# Patient Record
Sex: MALE
[De-identification: names, ages, dates, MRNs, and addresses within clinical notes are randomized per-mention and may not be internally consistent; named-entity substitution may affect disease eponyms.]

---
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
## 2021-08-02 ENCOUNTER — TRANSCRIPTION ENCOUNTER (OUTPATIENT)
Age: 21
End: 2021-08-02

## 2022-05-02 ENCOUNTER — NON-APPOINTMENT (OUTPATIENT)
Age: 22
End: 2022-05-02

## 2022-05-07 PROBLEM — M25.512 LEFT SHOULDER PAIN: Status: ACTIVE | Noted: 2022-05-07

## 2022-05-10 ENCOUNTER — APPOINTMENT (OUTPATIENT)
Dept: ORTHOPEDIC SURGERY | Facility: CLINIC | Age: 22
End: 2022-05-10
Payer: COMMERCIAL

## 2022-05-10 DIAGNOSIS — M25.512 PAIN IN LEFT SHOULDER: ICD-10-CM

## 2022-05-10 DIAGNOSIS — S46.012A STRAIN OF MUSCLE(S) AND TENDON(S) OF THE ROTATOR CUFF OF LEFT SHOULDER, INITIAL ENCOUNTER: ICD-10-CM

## 2022-05-10 PROCEDURE — 73030 X-RAY EXAM OF SHOULDER: CPT | Mod: LT

## 2022-05-10 PROCEDURE — 99203 OFFICE O/P NEW LOW 30 MIN: CPT

## 2022-05-13 NOTE — HISTORY OF PRESENT ILLNESS
[de-identified] : Patient is a 21 year-old, right-hand-dominant male who presents to the office today for initial evaluation of left shoulder pain. He was skiing and fell on his left shoulder. This occurred the last week of January. He felt pain right away. He was seen by the ER in [place]. Xrays were negative for fractures in the ER. Initially, he could almost not move the arm at all, but since then, he has had significant improvement in his ROM. He has sharp pain located over the lateral aspect of the shoulder. The pain is directly over the lateral aspect of the shoulder. It is intermittent and activity related (mostly with external rotation and abduction type activities). He has not taken anything other than Tylenol which gives some mild relief. He denies paresthesias in the left upper extremity. He has no complaints of neck pain or any other injuries.

## 2022-05-13 NOTE — DISCUSSION/SUMMARY
[de-identified] : The underlying pathophysiology was reviewed in great detail with the patient as well as the various treatment options, including ice, analgesics, NSAIDS, Physical therapy, steroid injections.\par \par A home exercise sheet was provided.\par A prescription was given for physical therapy.\par \par F/U 6 weeks\par \par we discussed getting an MRI if there is no improvement.

## 2022-05-13 NOTE — PHYSICAL EXAM
[de-identified] : Right Upper Extremity \par o Shoulder :\par ¦ Inspection/Palpation : no tenderness, swelling or deformities\par ¦ Range of Motion :  ACTIVE FLEXION 155 degrees, ACTIVE EXTERNAL ROTATION 55 degrees, ACTIVE INTERNAL ROTATION measured at L5, no crepitance\par ¦ Strength : supraspinatus 4/5, external rotation 5-/5, internal rotation 5/5\par ¦ Stability : no joint instability on provocative testing\par ¦ Tests/Signs : Neer (-), Segundo (-)\par o Upper Arm : no tenderness, swelling or deformities\par o Muscle Bulk : no atrophy \par o Sensation : sensation intact to light touch \par o Skin : no skin rash or discoloration\par o Vascular Exam : no edema or cyanosis, radial and ulnar pulses normal \par \par Left Upper Extremity \par o Shoulder :\par ¦ Inspection/Palpation : no tenderness, swelling or deformities\par ¦ Range of Motion : full and painless in all planes,  no crepitance\par ¦ Strength : supraspinatus 5/5, external rotation 5/5, internal rotation 5/5\par ¦ Stability : no joint instability on provocative testing\par ¦ Tests/Signs : Neer (-), Segundo (-)\par o Upper Arm : no tenderness, swelling or deformities\par o Muscle Bulk : no atrophy\par o Sensation : sensation intact to light touch\par o Skin : no skin rash or discoloration\par o Vascular Exam : no edema or cyanosis, radial and ulnar pulses normal\par  \par \par  [de-identified] : o Left Shoulder: Grashey, axillary and outlet views were obtained, there are no soft tissue abnormalities, alignment is normal, no fractures, normal appearing joint spaces, normal bone density, no bony lesions, small calcification medial to the proximal shaft of the humerus\par \par

## 2022-05-24 ENCOUNTER — TRANSCRIPTION ENCOUNTER (OUTPATIENT)
Age: 22
End: 2022-05-24

## 2023-07-24 ENCOUNTER — NON-APPOINTMENT (OUTPATIENT)
Age: 23
End: 2023-07-24

## 2023-07-24 ENCOUNTER — APPOINTMENT (OUTPATIENT)
Dept: ORTHOPEDIC SURGERY | Facility: CLINIC | Age: 23
End: 2023-07-24
Payer: COMMERCIAL

## 2023-07-24 DIAGNOSIS — M54.9 DORSALGIA, UNSPECIFIED: ICD-10-CM

## 2023-07-24 DIAGNOSIS — G89.29 DORSALGIA, UNSPECIFIED: ICD-10-CM

## 2023-07-24 PROCEDURE — 72070 X-RAY EXAM THORAC SPINE 2VWS: CPT

## 2023-07-24 PROCEDURE — 99213 OFFICE O/P EST LOW 20 MIN: CPT

## 2023-07-24 PROCEDURE — 72110 X-RAY EXAM L-2 SPINE 4/>VWS: CPT

## 2023-07-24 NOTE — PHYSICAL EXAM
[de-identified] : Gen: NAD\par Resp: Nonlabored respirations\par Gait: Nl\par Head: CN I - XII grossly intact\par \par Spine:\par Skin in tact, tender paraspinal region\par Full neck ROM\par UE: 5/5 D B T WE WF IO b/l\par SILT C4-T1 b/l\par (-)Lamb, Jose\par No hand atrophy\par 2+ rad bcr\par \par LE: 5/5 IP Q HS EHL TA GS\par SILT L3-S1 b/l\par (-) clonus, (-) babinski\par (-) slr, c/l slr\par 2+ dp pt wwp bcr  [de-identified] : The following radiographs were ordered and read by me during this patient's visit. I reviewed each radiograph in detail with the patient and discussed the findings as highlighted below.   4 views of the lumbar spine and 2 of the thoracic spine were obtained today that show no fracture, or dislocation. There are no degenerative changes seen. There is no malalignment or dynamic instability. Preserved lordosis.  No obvious osseous abnormality. Otherwise unremarkable.

## 2023-07-24 NOTE — DISCUSSION/SUMMARY
[de-identified] : 21yo M w thoracic strain without radicular sx.  The patient was extensively counseled on treatment options including but not limited to observation, rest/activity modification, bracing, anti-inflammatory medications, physical therapy, injections, and surgery.  The natural history of the disease was thoroughly explained.  We discussed that the majority of the time, this condition can be initially treated conservatively.\par The patient will proceed with:\par -PT\par -nsaid prn\par -pt was instructed on the importance of resting, icing and elevating to minimize swelling\par -RTC 6w\par \par I have personally obtained the history, reviewed the ROS as noted, and performed the physical examination today.  The patient and I discussed the assessment and options and developed the plan.  All questions were answered and the patient stated their understanding of the treatment plan and appreciation of the visit.\par \par My cumulative time spent on this patient's visit included: Preparation for the visit, review of the medical records, review of pertinent diagnostic studies, examination and counseling of the patient on the above diagnosis, treatment plan and prognosis, orders of diagnostic tests, medications and/or appropriate procedures and documentation in the medical records of today's visit. \par \par Ashish Major MD

## 2023-07-24 NOTE — HISTORY OF PRESENT ILLNESS
[de-identified] : 22 year old male presents with back pain that started a few weeks ago. States discomfort began after he started to work on having better posture. Denies any previous injury to the back. 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 Reports he still actively goes to the gym 5 times a week but has been avoiding back exercises. Denies any radicular symptoms. Laying down makes the pain better. Does not take any OTC or use heat or ice.  No systemic sx.